# Patient Record
Sex: MALE | Race: WHITE | Employment: UNEMPLOYED | URBAN - METROPOLITAN AREA
[De-identification: names, ages, dates, MRNs, and addresses within clinical notes are randomized per-mention and may not be internally consistent; named-entity substitution may affect disease eponyms.]

---
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
## 2024-06-03 ENCOUNTER — HOSPITAL ENCOUNTER (EMERGENCY)
Facility: HOSPITAL | Age: 24
Discharge: HOME | End: 2024-06-03
Attending: EMERGENCY MEDICINE
Payer: COMMERCIAL

## 2024-06-03 VITALS
BODY MASS INDEX: 27.2 KG/M2 | RESPIRATION RATE: 20 BRPM | HEART RATE: 80 BPM | SYSTOLIC BLOOD PRESSURE: 134 MMHG | OXYGEN SATURATION: 98 % | WEIGHT: 190 LBS | HEIGHT: 70 IN | TEMPERATURE: 97.8 F | DIASTOLIC BLOOD PRESSURE: 86 MMHG

## 2024-06-03 DIAGNOSIS — J02.0 STREP PHARYNGITIS: Primary | ICD-10-CM

## 2024-06-03 LAB — S PYO DNA BLD POS QL NAA+NON-PROBE: POSITIVE

## 2024-06-03 PROCEDURE — 99283 EMERGENCY DEPT VISIT LOW MDM: CPT | Performed by: EMERGENCY MEDICINE

## 2024-06-03 PROCEDURE — 87651 STREP A DNA AMP PROBE: CPT | Performed by: EMERGENCY MEDICINE

## 2024-06-03 PROCEDURE — 99284 EMERGENCY DEPT VISIT MOD MDM: CPT | Performed by: EMERGENCY MEDICINE

## 2024-06-03 NOTE — DISCHARGE INSTRUCTIONS
Penicillin 4 times daily for 7 days.    Ibuprofen 600 mg every 6 hours as needed for pain or fever.  Take food with ibuprofen to protect your stomach.  Tylenol (acetaminophen) 650 mg every 6 hours as needed for pain or fever.

## 2024-06-03 NOTE — ED PROVIDER NOTES
HPI:  Chief Complaint   Patient presents with    Sore Throat     Pt reports a sore throat and swelling since Friday.     HPI  22-year-old comes to the emergency department with throat pain and swelling stating his uvula is touching his tongue.  He denies fevers or chills.  No cough trouble breathing.  Feels like his swelling in uvula and throat gives him a sense that it is hard to breathe.    HISTORY:  No past medical history on file.    No past surgical history on file.    No family history on file.           PHYSICAL EXAM:  ED Triage Vitals [06/03/24 1534]   Temp Heart Rate Resp BP SpO2   36.6 °C (97.8 °F) 94 20 147/96 98 %      Mean BP (mmHg) Temp Source Heart Rate Source Patient Position BP Location   114 Oral Monitor Sitting Right arm      FiO2 (%)       --         Nursing note and vitals reviewed.  Constitutional: appears well-developed.   HEENT: Normocephalic and atraumatic.  Mucous membranes moist.  Posterior pharynx and peritonsillar arches are erythematous with erythema on uvula.  Conjunctiva normal.  Neck: Supple.  No meningismus  Pulmonary/Chest: No respiratory distress.  Clear to auscultation bilaterally  Musculoskeletal: No edema  Neurological: Alert. No focal deficits.  Skin: Warm and dry. No rash noted.   Psychiatric: Normal mood and affect.    MDM: Patient is offered ibuprofen for pain but declines.  Rapid strep swab is obtained and is positive.  He is given prescription for Pen-Vee K.  He is advised to follow with primary care when returns home.  Discharged stable condition.    Labs Reviewed   RAPID STREP SCREEN - Abnormal       Result Value    Streptococcus Pyogenes (GRP A) PCR Positive (*)     Narrative:     This test was performed using the FDA approved eleniheid GeneXpert Strep A PCR assay.          CLINICAL IMPRESSION:  Final diagnoses:   [J02.0] Strep pharyngitis               A voice recognition program was used to aid in documentation of this record.  Sometimes words are not printed exactly  as they were spoken.  While efforts were made to carefully edit and correct any inaccuracies, some areas may be present; please take these into context.  Please contact the provider if areas are identified.         Raúl Vee MD  06/03/24 1684

## (undated) DEVICE — PREP CHLORAPREP 26ML TINTED ORANGE  260815

## (undated) DEVICE — SOL WATER IRRIG 1000ML BOTTLE 07139-09

## (undated) RX ORDER — SIMETHICONE 40MG/0.6ML
SUSPENSION, DROPS(FINAL DOSAGE FORM)(ML) ORAL
Status: DISPENSED
Start: 2019-11-04

## (undated) RX ORDER — PROPOFOL 10 MG/ML
INJECTION, EMULSION INTRAVENOUS
Status: DISPENSED
Start: 2019-11-04